# Patient Record
Sex: FEMALE | Race: WHITE | NOT HISPANIC OR LATINO | Employment: OTHER | URBAN - METROPOLITAN AREA
[De-identification: names, ages, dates, MRNs, and addresses within clinical notes are randomized per-mention and may not be internally consistent; named-entity substitution may affect disease eponyms.]

---

## 2017-01-19 ENCOUNTER — COMMUNICATION - HEALTHEAST (OUTPATIENT)
Dept: SCHEDULING | Facility: CLINIC | Age: 62
End: 2017-01-19

## 2017-01-19 DIAGNOSIS — I10 ESSENTIAL HYPERTENSION: ICD-10-CM

## 2017-01-28 ENCOUNTER — COMMUNICATION - HEALTHEAST (OUTPATIENT)
Dept: FAMILY MEDICINE | Facility: CLINIC | Age: 62
End: 2017-01-28

## 2017-01-28 DIAGNOSIS — E11.9 DIABETES (H): ICD-10-CM

## 2017-01-29 ENCOUNTER — COMMUNICATION - HEALTHEAST (OUTPATIENT)
Dept: SCHEDULING | Facility: CLINIC | Age: 62
End: 2017-01-29

## 2017-01-29 DIAGNOSIS — E03.9 HYPOTHYROIDISM: ICD-10-CM

## 2017-02-19 ENCOUNTER — COMMUNICATION - HEALTHEAST (OUTPATIENT)
Dept: FAMILY MEDICINE | Facility: CLINIC | Age: 62
End: 2017-02-19

## 2017-02-19 DIAGNOSIS — I10 ESSENTIAL HYPERTENSION: ICD-10-CM

## 2017-03-14 ENCOUNTER — OFFICE VISIT - HEALTHEAST (OUTPATIENT)
Dept: FAMILY MEDICINE | Facility: CLINIC | Age: 62
End: 2017-03-14

## 2017-03-14 ENCOUNTER — AMBULATORY - HEALTHEAST (OUTPATIENT)
Dept: FAMILY MEDICINE | Facility: CLINIC | Age: 62
End: 2017-03-14

## 2017-03-14 ENCOUNTER — COMMUNICATION - HEALTHEAST (OUTPATIENT)
Dept: TELEHEALTH | Facility: CLINIC | Age: 62
End: 2017-03-14

## 2017-03-14 DIAGNOSIS — Z13.6 SCREENING FOR CARDIOVASCULAR CONDITION: ICD-10-CM

## 2017-03-14 DIAGNOSIS — I10 ESSENTIAL HYPERTENSION WITH GOAL BLOOD PRESSURE LESS THAN 140/90: ICD-10-CM

## 2017-03-14 DIAGNOSIS — E03.9 HYPOTHYROIDISM, UNSPECIFIED TYPE: ICD-10-CM

## 2017-03-14 DIAGNOSIS — M25.519 CHRONIC SHOULDER PAIN: ICD-10-CM

## 2017-03-14 DIAGNOSIS — G89.29 CHRONIC SHOULDER PAIN: ICD-10-CM

## 2017-03-14 DIAGNOSIS — Z51.81 MEDICATION MONITORING ENCOUNTER: ICD-10-CM

## 2017-03-14 DIAGNOSIS — E11.9 DIABETES TYPE 2, CONTROLLED (H): ICD-10-CM

## 2017-03-14 DIAGNOSIS — G43.909 MIGRAINE: ICD-10-CM

## 2017-03-14 LAB
CHOLEST SERPL-MCNC: 160 MG/DL
FASTING STATUS PATIENT QL REPORTED: YES
HBA1C MFR BLD: 6.9 % (ref 3.5–6)
HDLC SERPL-MCNC: 51 MG/DL
LDLC SERPL CALC-MCNC: 87 MG/DL
TRIGL SERPL-MCNC: 112 MG/DL

## 2017-03-14 ASSESSMENT — MIFFLIN-ST. JEOR: SCORE: 1559.67

## 2017-03-15 ENCOUNTER — COMMUNICATION - HEALTHEAST (OUTPATIENT)
Dept: FAMILY MEDICINE | Facility: CLINIC | Age: 62
End: 2017-03-15

## 2017-04-03 ENCOUNTER — COMMUNICATION - HEALTHEAST (OUTPATIENT)
Dept: FAMILY MEDICINE | Facility: CLINIC | Age: 62
End: 2017-04-03

## 2017-04-03 DIAGNOSIS — I10 ESSENTIAL HYPERTENSION: ICD-10-CM

## 2017-04-15 ENCOUNTER — COMMUNICATION - HEALTHEAST (OUTPATIENT)
Dept: FAMILY MEDICINE | Facility: CLINIC | Age: 62
End: 2017-04-15

## 2017-04-17 ENCOUNTER — COMMUNICATION - HEALTHEAST (OUTPATIENT)
Dept: FAMILY MEDICINE | Facility: CLINIC | Age: 62
End: 2017-04-17

## 2017-04-25 ENCOUNTER — RECORDS - HEALTHEAST (OUTPATIENT)
Dept: ADMINISTRATIVE | Facility: OTHER | Age: 62
End: 2017-04-25

## 2017-04-29 ENCOUNTER — COMMUNICATION - HEALTHEAST (OUTPATIENT)
Dept: SCHEDULING | Facility: CLINIC | Age: 62
End: 2017-04-29

## 2017-04-29 DIAGNOSIS — E03.9 HYPOTHYROIDISM: ICD-10-CM

## 2017-05-21 ENCOUNTER — COMMUNICATION - HEALTHEAST (OUTPATIENT)
Dept: FAMILY MEDICINE | Facility: CLINIC | Age: 62
End: 2017-05-21

## 2017-05-21 DIAGNOSIS — I10 ESSENTIAL HYPERTENSION: ICD-10-CM

## 2017-07-20 ENCOUNTER — COMMUNICATION - HEALTHEAST (OUTPATIENT)
Dept: FAMILY MEDICINE | Facility: CLINIC | Age: 62
End: 2017-07-20

## 2017-08-04 ENCOUNTER — COMMUNICATION - HEALTHEAST (OUTPATIENT)
Dept: FAMILY MEDICINE | Facility: CLINIC | Age: 62
End: 2017-08-04

## 2017-08-04 DIAGNOSIS — E11.9 DIABETES (H): ICD-10-CM

## 2017-08-17 ENCOUNTER — COMMUNICATION - HEALTHEAST (OUTPATIENT)
Dept: TELEHEALTH | Facility: CLINIC | Age: 62
End: 2017-08-17

## 2017-08-17 ENCOUNTER — OFFICE VISIT - HEALTHEAST (OUTPATIENT)
Dept: FAMILY MEDICINE | Facility: CLINIC | Age: 62
End: 2017-08-17

## 2017-08-17 DIAGNOSIS — Z98.1 S/P CERVICAL SPINAL FUSION: ICD-10-CM

## 2017-08-17 DIAGNOSIS — E78.2 MIXED HYPERLIPIDEMIA: ICD-10-CM

## 2017-08-17 DIAGNOSIS — E03.9 HYPOTHYROIDISM, UNSPECIFIED TYPE: ICD-10-CM

## 2017-08-17 DIAGNOSIS — M54.12 CERVICAL RADICULAR PAIN: ICD-10-CM

## 2017-08-17 DIAGNOSIS — E11.9 DIABETES TYPE 2, CONTROLLED (H): ICD-10-CM

## 2017-08-17 DIAGNOSIS — M54.2 NECK PAIN: ICD-10-CM

## 2017-08-17 DIAGNOSIS — I10 ESSENTIAL HYPERTENSION WITH GOAL BLOOD PRESSURE LESS THAN 140/90: ICD-10-CM

## 2017-08-17 ASSESSMENT — MIFFLIN-ST. JEOR: SCORE: 1559.95

## 2017-08-24 ENCOUNTER — HOSPITAL ENCOUNTER (OUTPATIENT)
Dept: MRI IMAGING | Facility: CLINIC | Age: 62
Discharge: HOME | End: 2017-08-24
Attending: FAMILY MEDICINE

## 2017-08-24 DIAGNOSIS — M54.2 NECK PAIN: ICD-10-CM

## 2017-08-24 DIAGNOSIS — Z98.1 S/P CERVICAL SPINAL FUSION: ICD-10-CM

## 2017-08-24 DIAGNOSIS — M54.12 CERVICAL RADICULAR PAIN: ICD-10-CM

## 2017-09-02 ENCOUNTER — COMMUNICATION - HEALTHEAST (OUTPATIENT)
Dept: SCHEDULING | Facility: CLINIC | Age: 62
End: 2017-09-02

## 2017-09-05 ENCOUNTER — COMMUNICATION - HEALTHEAST (OUTPATIENT)
Dept: FAMILY MEDICINE | Facility: CLINIC | Age: 62
End: 2017-09-05

## 2017-09-06 ENCOUNTER — HOSPITAL ENCOUNTER (OUTPATIENT)
Dept: MAMMOGRAPHY | Facility: CLINIC | Age: 62
Discharge: HOME | End: 2017-09-06
Attending: FAMILY MEDICINE

## 2017-09-06 DIAGNOSIS — Z12.31 VISIT FOR SCREENING MAMMOGRAM: ICD-10-CM

## 2017-09-12 ENCOUNTER — OFFICE VISIT - HEALTHEAST (OUTPATIENT)
Dept: FAMILY MEDICINE | Facility: CLINIC | Age: 62
End: 2017-09-12

## 2017-09-12 DIAGNOSIS — M54.2 NECK PAIN: ICD-10-CM

## 2017-09-12 DIAGNOSIS — M48.02 SPINAL STENOSIS OF CERVICAL REGION: ICD-10-CM

## 2017-09-12 DIAGNOSIS — H65.90 SEROUS OTITIS MEDIA: ICD-10-CM

## 2017-09-12 ASSESSMENT — MIFFLIN-ST. JEOR: SCORE: 1551.95

## 2017-09-25 ENCOUNTER — COMMUNICATION - HEALTHEAST (OUTPATIENT)
Dept: ADMINISTRATIVE | Facility: CLINIC | Age: 62
End: 2017-09-25

## 2017-09-25 ENCOUNTER — COMMUNICATION - HEALTHEAST (OUTPATIENT)
Dept: FAMILY MEDICINE | Facility: CLINIC | Age: 62
End: 2017-09-25

## 2017-09-25 DIAGNOSIS — H92.01 RIGHT EAR PAIN: ICD-10-CM

## 2017-09-25 DIAGNOSIS — R42 DIZZINESS: ICD-10-CM

## 2017-09-26 ENCOUNTER — COMMUNICATION - HEALTHEAST (OUTPATIENT)
Dept: FAMILY MEDICINE | Facility: CLINIC | Age: 62
End: 2017-09-26

## 2017-10-02 ENCOUNTER — RECORDS - HEALTHEAST (OUTPATIENT)
Dept: ADMINISTRATIVE | Facility: OTHER | Age: 62
End: 2017-10-02

## 2017-10-14 ENCOUNTER — COMMUNICATION - HEALTHEAST (OUTPATIENT)
Dept: FAMILY MEDICINE | Facility: CLINIC | Age: 62
End: 2017-10-14

## 2017-10-14 DIAGNOSIS — I10 HYPERTENSION: ICD-10-CM

## 2017-10-31 ENCOUNTER — OFFICE VISIT - HEALTHEAST (OUTPATIENT)
Dept: FAMILY MEDICINE | Facility: CLINIC | Age: 62
End: 2017-10-31

## 2017-10-31 DIAGNOSIS — E78.2 MIXED HYPERLIPIDEMIA: ICD-10-CM

## 2017-10-31 DIAGNOSIS — I10 ESSENTIAL HYPERTENSION WITH GOAL BLOOD PRESSURE LESS THAN 140/90: ICD-10-CM

## 2017-10-31 DIAGNOSIS — E03.9 HYPOTHYROIDISM, UNSPECIFIED TYPE: ICD-10-CM

## 2017-10-31 DIAGNOSIS — R42 VERTIGO: ICD-10-CM

## 2017-10-31 DIAGNOSIS — E11.9 DIABETES TYPE 2, CONTROLLED (H): ICD-10-CM

## 2017-10-31 DIAGNOSIS — M50.30 DEGENERATIVE DISC DISEASE, CERVICAL: ICD-10-CM

## 2017-10-31 LAB
CHOLEST SERPL-MCNC: 168 MG/DL
FASTING STATUS PATIENT QL REPORTED: YES
HBA1C MFR BLD: 6.6 % (ref 3.5–6)
HDLC SERPL-MCNC: 53 MG/DL
LDLC SERPL CALC-MCNC: 94 MG/DL
TRIGL SERPL-MCNC: 104 MG/DL

## 2017-10-31 ASSESSMENT — MIFFLIN-ST. JEOR: SCORE: 1547.76

## 2017-11-02 ENCOUNTER — COMMUNICATION - HEALTHEAST (OUTPATIENT)
Dept: FAMILY MEDICINE | Facility: CLINIC | Age: 62
End: 2017-11-02

## 2017-11-02 DIAGNOSIS — E11.9 DIABETES (H): ICD-10-CM

## 2017-11-09 ENCOUNTER — COMMUNICATION - HEALTHEAST (OUTPATIENT)
Dept: FAMILY MEDICINE | Facility: CLINIC | Age: 62
End: 2017-11-09

## 2017-12-17 ENCOUNTER — COMMUNICATION - HEALTHEAST (OUTPATIENT)
Dept: FAMILY MEDICINE | Facility: CLINIC | Age: 62
End: 2017-12-17

## 2018-04-20 ENCOUNTER — COMMUNICATION - HEALTHEAST (OUTPATIENT)
Dept: SCHEDULING | Facility: CLINIC | Age: 63
End: 2018-04-20

## 2018-04-20 DIAGNOSIS — I10 ESSENTIAL HYPERTENSION: ICD-10-CM

## 2018-05-25 ENCOUNTER — COMMUNICATION - HEALTHEAST (OUTPATIENT)
Dept: SCHEDULING | Facility: CLINIC | Age: 63
End: 2018-05-25

## 2018-09-06 ENCOUNTER — RECORDS - HEALTHEAST (OUTPATIENT)
Dept: ADMINISTRATIVE | Facility: OTHER | Age: 63
End: 2018-09-06

## 2018-11-20 ENCOUNTER — RECORDS - HEALTHEAST (OUTPATIENT)
Dept: ADMINISTRATIVE | Facility: OTHER | Age: 63
End: 2018-11-20

## 2020-08-13 ENCOUNTER — OFFICE VISIT (OUTPATIENT)
Dept: NEUROLOGY | Facility: CLINIC | Age: 65
End: 2020-08-13
Payer: COMMERCIAL

## 2020-08-13 VITALS — WEIGHT: 211 LBS | HEIGHT: 64 IN | BODY MASS INDEX: 36.02 KG/M2

## 2020-08-13 DIAGNOSIS — G43.709 CHRONIC MIGRAINE WITHOUT AURA WITHOUT STATUS MIGRAINOSUS, NOT INTRACTABLE: Primary | ICD-10-CM

## 2020-08-13 PROBLEM — M54.2 NECK PAIN: Status: ACTIVE | Noted: 2020-08-13

## 2020-08-13 PROBLEM — G43.909 MIGRAINE HEADACHE: Status: ACTIVE | Noted: 2020-08-13

## 2020-08-13 PROCEDURE — 99213 OFFICE O/P EST LOW 20 MIN: CPT | Mod: 95 | Performed by: PSYCHIATRY & NEUROLOGY

## 2020-08-13 RX ORDER — HYDROCODONE BITARTRATE AND ACETAMINOPHEN 5; 325 MG/1; MG/1
1 TABLET ORAL PRN
COMMUNITY
Start: 2020-07-16

## 2020-08-13 RX ORDER — LOSARTAN POTASSIUM 50 MG/1
50 TABLET ORAL DAILY
COMMUNITY
Start: 2019-10-31

## 2020-08-13 RX ORDER — LEVOTHYROXINE SODIUM 75 UG/1
75 TABLET ORAL DAILY
COMMUNITY
Start: 2019-10-31

## 2020-08-13 RX ORDER — DIVALPROEX SODIUM 500 MG/1
1000 TABLET, EXTENDED RELEASE ORAL DAILY
Qty: 180 TABLET | Refills: 3 | Status: SHIPPED | OUTPATIENT
Start: 2020-08-13 | End: 2021-06-01

## 2020-08-13 RX ORDER — SEMAGLUTIDE 1.34 MG/ML
0.5 INJECTION, SOLUTION SUBCUTANEOUS WEEKLY
COMMUNITY
Start: 2020-07-31

## 2020-08-13 RX ORDER — DIVALPROEX SODIUM 250 MG/1
500 TABLET, EXTENDED RELEASE ORAL DAILY
COMMUNITY
Start: 2019-09-16

## 2020-08-13 RX ORDER — BACLOFEN 10 MG/1
10 TABLET ORAL DAILY
COMMUNITY
Start: 2020-06-04

## 2020-08-13 RX ORDER — DIVALPROEX SODIUM 250 MG/1
250 TABLET, DELAYED RELEASE ORAL DAILY
COMMUNITY

## 2020-08-13 RX ORDER — SIMVASTATIN 20 MG
20 TABLET ORAL DAILY
COMMUNITY
Start: 2020-05-08

## 2020-08-13 RX ORDER — RIZATRIPTAN BENZOATE 10 MG/1
10 TABLET ORAL DAILY
COMMUNITY
Start: 2019-10-22

## 2020-08-13 RX ORDER — POTASSIUM CHLORIDE 1500 MG/1
20 TABLET, EXTENDED RELEASE ORAL DAILY
COMMUNITY
Start: 2019-10-31

## 2020-08-13 RX ORDER — ESTRADIOL 0.1 MG/G
1 CREAM VAGINAL DAILY
COMMUNITY
Start: 2019-10-31

## 2020-08-13 RX ORDER — METOPROLOL TARTRATE 50 MG
50 TABLET ORAL DAILY
COMMUNITY
Start: 2020-01-21

## 2020-08-13 RX ORDER — HYDROCHLOROTHIAZIDE 50 MG/1
50 TABLET ORAL DAILY
COMMUNITY
Start: 2019-10-31

## 2020-08-13 RX ORDER — GABAPENTIN 100 MG/1
600 CAPSULE ORAL DAILY
COMMUNITY
Start: 2019-10-22

## 2020-08-13 ASSESSMENT — MIFFLIN-ST. JEOR: SCORE: 1487.09

## 2020-08-13 NOTE — NURSING NOTE
Chief Complaint   Patient presents with     Follow Up     yealy check in for meication- stopped taken afternoon dose 12/17/19- RX needs to be changed.      Only 4 migraines since stopping the afternoon use of Divalproex.  Doing well at this time.  Patient also notes her Gabapentin medication has recently been increased.    Phone visit-please call [phone]  Vicki Guillen ATC

## 2020-08-13 NOTE — PROGRESS NOTES
Isabelle Caraballo  65 year old  MRN:[id number]  PCP: Vonnie James  No ref. provider found    Allergies   Allergen Reactions     Ibuprofen Shortness Of Breath     Lisinopril Cough     Meperidine Nausea and Vomiting     Metformin Headache     Other reaction(s): headaches  Gastrointestinal distress, migraines       Methylprednisolone      Other reaction(s): shortness of breath     Metronidazole        Current Outpatient Medications   Medication Sig Dispense Refill     baclofen (LIORESAL) 10 MG tablet Take 10 mg by mouth daily       divalproex sodium extended-release (DEPAKOTE ER) 250 MG 24 hr tablet Take 500 mg by mouth daily       estradiol (ESTRACE) 0.1 MG/GM vaginal cream Place 1 g vaginally daily       gabapentin (NEURONTIN) 100 MG capsule Take 600 mg by mouth daily At bed time       hydrochlorothiazide (HYDRODIURIL) 50 MG tablet Take 50 mg by mouth daily       HYDROcodone-acetaminophen (NORCO) 5-325 MG tablet Take 1 tablet by mouth as needed       levothyroxine (SYNTHROID/LEVOTHROID) 75 MCG tablet Take 75 mcg by mouth daily       losartan (COZAAR) 50 MG tablet Take 50 mg by mouth daily       metoprolol tartrate (LOPRESSOR) 50 MG tablet Take 50 mg by mouth daily       potassium chloride ER (KLOR-CON M) 20 MEQ CR tablet Take 20 mEq by mouth daily       rizatriptan (MAXALT) 10 MG tablet Take 10 mg by mouth daily       Semaglutide,0.25 or 0.5MG/DOS, (OZEMPIC, 0.25 OR 0.5 MG/DOSE,) 2 MG/1.5ML SOPN Inject 0.5 mg Subcutaneous once a week       simvastatin (ZOCOR) 20 MG tablet Take 20 mg by mouth daily       divalproex sodium delayed-release (DEPAKOTE) 250 MG DR tablet Take 250 mg by mouth daily 2 am- 2 pm         CHIEF COMPLAINT: Follow-up migraine headaches.  Left arm pain.    HISTORY SINCE LAST VISIT: A telephone encounter was done today in lieu of office visit.  Patient was agreeable to this type of encounter.  I spoke with Ms. Caraballo today in neurologic follow-up of her migraine headaches.  I had last seen her on  October 22, 2019.  Her migraine headaches had been doing better on a higher dose of Depakote, taking 500 mg 3 times a day.  We have discussed decreasing her Depakote dosage if her liver function tests were still elevated.  Her ALT had been 61 and AST had been 74 on 10/31/2019.  She had decreased her dosage to 500 mg twice daily in December 2019.  She has only had 4 migrainous headaches in the intervening time.  There is been no loss of vision or double vision.  No speech or swallowing problems.  She feels her strength is been okay.  She has had problems with left arm pain, starting in May [place] and half of July.  The pain does go down her left arm.  There has been an increase of her gabapentin to 600 mg at bedtime, with the possibility of increasing to 600 mg twice daily.  ANGE Thornton mentioned altered feeling in a left C7 distribution.  The patient reports numbness on the top of her hand.  Patient reports pain in the hands been controlled with the addition of Vicodin.  She has an upcoming appointment with ANGE Prince  PAST MEDICAL HISTORY:   Past Medical History:   Diagnosis Date     Diabetes (H)      Migraines      Spinal stenosis      Patient Active Problem List   Diagnosis     Migraine headache     Neck pain     History reviewed. No pertinent surgical history.    FAMILY HISTORY:  Family History   Problem Relation Age of Onset     No Known Problems Mother      No Known Problems Father        SOCIAL HISTORY:  Social History     Socioeconomic History     Marital status:      Spouse name: Not on file     Number of children: Not on file     Years of education: Not on file     Highest education level: Not on file   Occupational History     Not on file   Social Needs     Financial resource strain: Not on file     Food insecurity     Worry: Not on file     Inability: Not on file     Transportation needs     Medical: Not on file     Non-medical: Not on file   Tobacco Use     Smoking status: Former  Smoker     Types: Cigarettes     Smokeless tobacco: Never Used     Tobacco comment: quite more than 30 years ago   Substance and Sexual Activity     Alcohol use: Not Currently     Drug use: Never     Sexual activity: Yes   Lifestyle     Physical activity     Days per week: Not on file     Minutes per session: Not on file     Stress: Not on file   Relationships     Social connections     Talks on phone: Not on file     Gets together: Not on file     Attends Church service: Not on file     Active member of club or organization: Not on file     Attends meetings of clubs or organizations: Not on file     Relationship status: Not on file     Intimate partner violence     Fear of current or ex partner: Not on file     Emotionally abused: Not on file     Physically abused: Not on file     Forced sexual activity: Not on file   Other Topics Concern     Parent/sibling w/ CABG, MI or angioplasty before 65F 55M? No   Social History Narrative     Not on file       REVIEW OF SYSTEMS:    Constitutional: No fever or chills.  Eyes: No loss of vision or double vision.  Ears/Nose/Throat: Positive for hearing loss.  Positive for vertigo.  Respiratory: No shortness of breath or cough.  Genitourinary: Bladder control has been better.  Positive nocturia  Musculoskeletal: Positive neck and left arm pain.  Neurologic: See above.  Psychiatric: Positive for depression.  Hematologic/Lymphatic/Immunologic:    Endocrine: Feels her diabetes has been stable.    Pain: On a scale of 1 being no pain to 10 being extremely painful. Patient States: 4 based upon left arm pain    PHYSICAL EXAMINATION:    General appearance: Unable-telephone encounter      NEUROLOGIC EXAMINATION:  Alert oriented x3.  Speech is fluent.  Normal repetition and comprehension.  Can tell me the name of the president and the governor.              IMPRESSION: 1.  Chronic migraine.  She reports her headaches have been under better control with her taking 500 mg twice daily of  Depakote ER, with not taking the midday dosage.  She has had 4 migraines since December.  We discussed with the extended release preparation, she can just take this 1 time a day and I have sent in a prescription for 500 mg tablets of Depakote extended release.  She can take 2 pills at bedtime and see how her headaches do.  2.  Neck pain/pain down the left arm.  This does sound to fit with a cervical radiculopathy.  There is been improvement with the increase of gabapentin with the use of Vicodin.  We discussed that individuals who have fusions can have problems at the level above or below the area of the fusion.  Last MRI had been 3 years ago which showed mild narrowing of the left neuroforamina at C4-5 with moderate left foraminal narrowing at C5-6.  I told her I would agree that going ahead with an MRI scan would be good to do to see if there has been any interval change since 2017, especially with the persistent pain.  Start of telephone encounter: 1:05 PM  End of telephone encounter: 1:28 PM            Anil Bowles MD, MD

## 2020-08-13 NOTE — LETTER
8/13/2020         RE: Isabelle Caraballo  [address]  [place] [address]        Dear Colleague,    Thank you for referring your patient, Isabelle Caraballo, to the [facility]. Please see a copy of my visit note below.        Isabelle Caraballo  65 year old  MRN:[id number]  PCP: Vonnie James  No ref. provider found    Allergies   Allergen Reactions     Ibuprofen Shortness Of Breath     Lisinopril Cough     Meperidine Nausea and Vomiting     Metformin Headache     Other reaction(s): headaches  Gastrointestinal distress, migraines       Methylprednisolone      Other reaction(s): shortness of breath     Metronidazole        Current Outpatient Medications   Medication Sig Dispense Refill     baclofen (LIORESAL) 10 MG tablet Take 10 mg by mouth daily       divalproex sodium extended-release (DEPAKOTE ER) 250 MG 24 hr tablet Take 500 mg by mouth daily       estradiol (ESTRACE) 0.1 MG/GM vaginal cream Place 1 g vaginally daily       gabapentin (NEURONTIN) 100 MG capsule Take 600 mg by mouth daily At bed time       hydrochlorothiazide (HYDRODIURIL) 50 MG tablet Take 50 mg by mouth daily       HYDROcodone-acetaminophen (NORCO) 5-325 MG tablet Take 1 tablet by mouth as needed       levothyroxine (SYNTHROID/LEVOTHROID) 75 MCG tablet Take 75 mcg by mouth daily       losartan (COZAAR) 50 MG tablet Take 50 mg by mouth daily       metoprolol tartrate (LOPRESSOR) 50 MG tablet Take 50 mg by mouth daily       potassium chloride ER (KLOR-CON M) 20 MEQ CR tablet Take 20 mEq by mouth daily       rizatriptan (MAXALT) 10 MG tablet Take 10 mg by mouth daily       Semaglutide,0.25 or 0.5MG/DOS, (OZEMPIC, 0.25 OR 0.5 MG/DOSE,) 2 MG/1.5ML SOPN Inject 0.5 mg Subcutaneous once a week       simvastatin (ZOCOR) 20 MG tablet Take 20 mg by mouth daily       divalproex sodium delayed-release (DEPAKOTE) 250 MG DR tablet Take 250 mg by mouth daily 2 am- 2 pm         CHIEF COMPLAINT: Follow-up migraine headaches.  Left arm  pain.    HISTORY SINCE LAST VISIT: A telephone encounter was done today in lieu of office visit.  Patient was agreeable to this type of encounter.  I spoke with Ms. Caraballo today in neurologic follow-up of her migraine headaches.  I had last seen her on October 22, 2019.  Her migraine headaches had been doing better on a higher dose of Depakote, taking 500 mg 3 times a day.  We have discussed decreasing her Depakote dosage if her liver function tests were still elevated.  Her ALT had been 61 and AST had been 74 on 10/31/2019.  She had decreased her dosage to 500 mg twice daily in December 2019.  She has only had 4 migrainous headaches in the intervening time.  There is been no loss of vision or double vision.  No speech or swallowing problems.  She feels her strength is been okay.  She has had problems with left arm pain, starting in May [place] and half of July.  The pain does go down her left arm.  There has been an increase of her gabapentin to 600 mg at bedtime, with the possibility of increasing to 600 mg twice daily.  ANGE Thornton mentioned altered feeling in a left C7 distribution.  The patient reports numbness on the top of her hand.  Patient reports pain in the hands been controlled with the addition of Vicodin.  She has an upcoming appointment with ANGE Prince  PAST MEDICAL HISTORY:   Past Medical History:   Diagnosis Date     Diabetes (H)      Migraines      Spinal stenosis      Patient Active Problem List   Diagnosis     Migraine headache     Neck pain     History reviewed. No pertinent surgical history.    FAMILY HISTORY:  Family History   Problem Relation Age of Onset     No Known Problems Mother      No Known Problems Father        SOCIAL HISTORY:  Social History     Socioeconomic History     Marital status:      Spouse name: Not on file     Number of children: Not on file     Years of education: Not on file     Highest education level: Not on file   Occupational History     Not on  file   Social Needs     Financial resource strain: Not on file     Food insecurity     Worry: Not on file     Inability: Not on file     Transportation needs     Medical: Not on file     Non-medical: Not on file   Tobacco Use     Smoking status: Former Smoker     Types: Cigarettes     Smokeless tobacco: Never Used     Tobacco comment: quite more than 30 years ago   Substance and Sexual Activity     Alcohol use: Not Currently     Drug use: Never     Sexual activity: Yes   Lifestyle     Physical activity     Days per week: Not on file     Minutes per session: Not on file     Stress: Not on file   Relationships     Social connections     Talks on phone: Not on file     Gets together: Not on file     Attends Holiness service: Not on file     Active member of club or organization: Not on file     Attends meetings of clubs or organizations: Not on file     Relationship status: Not on file     Intimate partner violence     Fear of current or ex partner: Not on file     Emotionally abused: Not on file     Physically abused: Not on file     Forced sexual activity: Not on file   Other Topics Concern     Parent/sibling w/ CABG, MI or angioplasty before 65F 55M? No   Social History Narrative     Not on file       REVIEW OF SYSTEMS:    Constitutional: No fever or chills.  Eyes: No loss of vision or double vision.  Ears/Nose/Throat: Positive for hearing loss.  Positive for vertigo.  Respiratory: No shortness of breath or cough.  Genitourinary: Bladder control has been better.  Positive nocturia  Musculoskeletal: Positive neck and left arm pain.  Neurologic: See above.  Psychiatric: Positive for depression.  Hematologic/Lymphatic/Immunologic:    Endocrine: Feels her diabetes has been stable.    Pain: On a scale of 1 being no pain to 10 being extremely painful. Patient States: 4 based upon left arm pain    PHYSICAL EXAMINATION:    General appearance: Unable-telephone encounter      NEUROLOGIC EXAMINATION:  Alert oriented x3.   Speech is fluent.  Normal repetition and comprehension.  Can tell me the name of the president and the governor.              IMPRESSION: 1.  Chronic migraine.  She reports her headaches have been under better control with her taking 500 mg twice daily of Depakote ER, with not taking the midday dosage.  She has had 4 migraines since December.  We discussed with the extended release preparation, she can just take this 1 time a day and I have sent in a prescription for 500 mg tablets of Depakote extended release.  She can take 2 pills at bedtime and see how her headaches do.  2.  Neck pain/pain down the left arm.  This does sound to fit with a cervical radiculopathy.  There is been improvement with the increase of gabapentin with the use of Vicodin.  We discussed that individuals who have fusions can have problems at the level above or below the area of the fusion.  Last MRI had been 3 years ago which showed mild narrowing of the left neuroforamina at C4-5 with moderate left foraminal narrowing at C5-6.  I told her I would agree that going ahead with an MRI scan would be good to do to see if there has been any interval change since 2017, especially with the persistent pain.  Start of telephone encounter: 1:05 PM  End of telephone encounter: 1:28 PM            Anil Bowlse MD, MD    Again, thank you for allowing me to participate in the care of your patient.        Sincerely,        Anil Bowles MD, MD

## 2020-08-13 NOTE — PATIENT INSTRUCTIONS
I have switched your Depakote extended release tablets to 500 mg strength tablets.  With this being an extended release formulation, you can take it just 1 time a day.  I recommend taking 2 of the tablets at bedtime.  We can see how this will do for your headaches.  We can plan follow-up in 1 years time.

## 2020-08-17 ENCOUNTER — TELEPHONE (OUTPATIENT)
Dept: NEUROLOGY | Facility: CLINIC | Age: 65
End: 2020-08-17

## 2020-08-17 NOTE — TELEPHONE ENCOUNTER
Pt lmasking to clarify directions on the Depakote. Dr Bowles changed it to 500 mg 2 at bedtime. Pharm has it as taking in the am. [phone]

## 2020-08-17 NOTE — TELEPHONE ENCOUNTER
I left message for patient to call.  Instructions on prescription sent to pharmacy by Dr. Bowles  indicate patient is to take two tabs daily.  Dr. Bowles's after visit summary which I am sending in mail to patient recommends taking the 2 extended release tablets at bedtime.  Perla Ortiz on 8/17/2020 at 1:57 PM

## 2020-08-21 NOTE — TELEPHONE ENCOUNTER
I spoke with patient who says she received visit summary in the mail that states she is to take the Depakote ER at bedtime.  Patient says she is taking Depakote ER at bedtime as instructed.  Perla Ortiz on 8/21/2020 at 11:35 AM

## 2021-02-02 ENCOUNTER — TELEPHONE (OUTPATIENT)
Dept: NEUROLOGY | Facility: CLINIC | Age: 66
End: 2021-02-02

## 2021-02-02 NOTE — TELEPHONE ENCOUNTER
Pt called to ask if she could take her Divelproex 500 MG 1 bid, instead of 2 at bedtime. She feels this might better help with the head and back pain. [phone] ok to lm

## 2021-02-04 NOTE — TELEPHONE ENCOUNTER
Patient informed of Dr. Bowles's message.  Patient says she took two Depakote last night and asks how to proceed with the change to one twice a day.  Patient informed per Dr. Bowles to take one now, one tonight and then start one twice a day ( morning and night ) tomorrow.  Patient commented that she was in [place] this past November with Covid 19 that made her very ill.  Patient says she is planning to get the vaccine.  Perla Ortiz RN on 2/4/2021 at 2:43 PM

## 2021-05-26 ENCOUNTER — RECORDS - HEALTHEAST (OUTPATIENT)
Dept: ADMINISTRATIVE | Facility: CLINIC | Age: 66
End: 2021-05-26

## 2021-05-27 ENCOUNTER — RECORDS - HEALTHEAST (OUTPATIENT)
Dept: ADMINISTRATIVE | Facility: CLINIC | Age: 66
End: 2021-05-27

## 2021-05-28 ENCOUNTER — RECORDS - HEALTHEAST (OUTPATIENT)
Dept: ADMINISTRATIVE | Facility: CLINIC | Age: 66
End: 2021-05-28

## 2021-05-29 ENCOUNTER — RECORDS - HEALTHEAST (OUTPATIENT)
Dept: ADMINISTRATIVE | Facility: CLINIC | Age: 66
End: 2021-05-29

## 2021-05-30 ENCOUNTER — RECORDS - HEALTHEAST (OUTPATIENT)
Dept: ADMINISTRATIVE | Facility: CLINIC | Age: 66
End: 2021-05-30

## 2021-05-30 VITALS — WEIGHT: 227 LBS | BODY MASS INDEX: 38.76 KG/M2 | HEIGHT: 64 IN

## 2021-05-31 VITALS — BODY MASS INDEX: 38.46 KG/M2 | HEIGHT: 64 IN | WEIGHT: 225.3 LBS

## 2021-05-31 VITALS — HEIGHT: 64 IN | WEIGHT: 227.06 LBS | BODY MASS INDEX: 38.76 KG/M2

## 2021-05-31 VITALS — WEIGHT: 224.38 LBS | BODY MASS INDEX: 38.31 KG/M2 | HEIGHT: 64 IN

## 2021-06-02 ENCOUNTER — RECORDS - HEALTHEAST (OUTPATIENT)
Dept: ADMINISTRATIVE | Facility: CLINIC | Age: 66
End: 2021-06-02

## 2021-06-09 NOTE — PROGRESS NOTES
OV     3/14/2017  Assessment:     1. Diabetes type 2, controlled  Glycosylated Hemoglobin A1c    Comprehensive Metabolic Panel   2. Essential hypertension with goal blood pressure less than 140/90  Comprehensive Metabolic Panel   3. Hypothyroidism, unspecified type  Thyroid Stimulating Hormone (TSH)    T4, Free   4. Migraine     5. Chronic shoulder pain     6. Medication monitoring encounter  HM2(CBC w/o Differential)    Valproic Acid (Depakene )   7. Screening for cardiovascular condition  Lipid Profile         Plan:      Fasting labs were drawn.  Blood sugars are under worsening, but adequate control. We reviewed her current medications and she will continue the same pending additional lab results. We discussed the idea that if her A1c increases further, we will want to adjust her treatment. We reviewed dietary recommendations, including low salt and high fiber diet, and recommendations for regular exercise/activity. She will continue the baclofen for her shoulder symptoms and depakote for her migraine headaches. She will plan to follow up in 3-4 mos for repeat fasting labs and med check, sooner if any difficulties.      Subjective:   Fasting today? Yes    Diabetes      Isabelle Caraballo is a 61 y.o. female who presents for follow-up of Type 2 diabetes mellitus. Compliance with treatment has been fair. Current symptoms/problems include hypoglycemia  and increase appetite. Patient denies foot ulcerations, nausea, paresthesia of the feet, polyuria, visual disturbances and vomiting. Home sugars: BGs consistently in an acceptable range. Current monitoring regimen: home blood tests - 1-2 times daily. Any episodes of hypoglycemia? yes - occasional mild symptoms. Weight trend: fluctuating a bit. Last dilated eye exam: unsure.      Known diabetic complications: none. Current diabetic medications include oral agent (monotherapy): glipizide (generic). Current side effects: none. Prior visit with dietician: yes. Current diet: in  "general, an \"unhealthy\" diet. She's been careless with sugar in diet recently. Current exercise: no regular exercise.      Is she on ACE inhibitor or angiotensin II receptor blocker? Yes.  lisinopril (generic)     Hyperlipidemia & Hypertension    Patient is also here for follow-up of hypertension and dyslipidemia. A repeat fasting lipid profile was done. Compliance with treatment has been good. Patient denies muscle pain associated with her medications. Cardiac symptoms: none.  Patient denies chest pain, dyspnea, exertional chest pressure/discomfort, fatigue, lower extremity edema and near-syncope. The patient exercises rarely. Previous history of cardiac disease includes: none.    Review of Systems  Pertinent items are noted in HPI.      Objective:        Visit Vitals     /86     Pulse 64     Ht 5' 4\" (1.626 m)     Wt (!) 227 lb (103 kg)     BMI 38.96 kg/m2     General:    Alert, cooperative, no distress   Head:    Normocephalic, without obvious abnormality, atraumatic   Eyes:    PERRL, conjunctiva/corneas clear, EOM's intact    Ears:    Normal TM's and external ear canals, both ears   Nose:   Nares normal, mucosa normal, no drainage or sinus tenderness   Throat:   Lips, mucosa, and tongue normal; teeth and gums normal   Neck:   Supple, symmetrical,  no adenopathy;  thyroid:  normal   Back:     Symmetric, ROM normal, no CVA tenderness   Lungs:     Clear to auscultation bilaterally, respirations unlabored   CV:    Regular rate and rhythm   Abdomen:     Soft, non-tender, bowel sounds active all four quadrants,     no masses, no organomegaly   Extremities:   Extremities normal, atraumatic, no cyanosis or edema   Pulses:   2+ and symmetric all extremities   Skin:   Skin color, texture, turgor normal, no rashes or lesions   Neurologic:   Normal strength, sensation and reflexes throughout       Laboratory:     Results for orders placed or performed in visit on 03/14/17   Glycosylated Hemoglobin A1c   Result Value " Ref Range    Hemoglobin A1c 6.9 (H) 3.5 - 6.0 %   Comprehensive Metabolic Panel   Result Value Ref Range    Sodium 141 136 - 145 mmol/L    Potassium 4.2 3.5 - 5.0 mmol/L    Chloride 104 98 - 107 mmol/L    CO2 27 22 - 31 mmol/L    Anion Gap, Calculation 10 5 - 18 mmol/L    Glucose 125 70 - 125 mg/dL    BUN 20 8 - 22 mg/dL    Creatinine 0.81 0.60 - 1.10 mg/dL    GFR MDRD Af Amer >60 >60 mL/min/1.73m2    GFR MDRD Non Af Amer >60 >60 mL/min/1.73m2    Bilirubin, Total 0.4 0.0 - 1.0 mg/dL    Calcium 10.2 8.5 - 10.5 mg/dL    Protein, Total 7.2 6.0 - 8.0 g/dL    Albumin 3.9 3.5 - 5.0 g/dL    Alkaline Phosphatase 70 45 - 120 U/L    AST 39 0 - 40 U/L    ALT 41 0 - 45 U/L   Lipid Profile   Result Value Ref Range    Triglycerides 112 <=149 mg/dL    Cholesterol 160 <=199 mg/dL    LDL Calculated 87 <=129 mg/dL    HDL Cholesterol 51 >=50 mg/dL    Patient Fasting > 8hrs? Yes    HM2(CBC w/o Differential)   Result Value Ref Range    WBC 3.9 (L) 4.0 - 11.0 thou/uL    RBC 4.99 3.80 - 5.40 mill/uL    Hemoglobin 14.6 12.0 - 16.0 g/dL    Hematocrit 44.0 35.0 - 47.0 %    MCV 88 80 - 100 fL    MCH 29.3 27.0 - 34.0 pg    MCHC 33.2 32.0 - 36.0 g/dL    RDW 13.4 11.0 - 14.5 %    Platelets 221 140 - 440 thou/uL    MPV 8.3 7.0 - 10.0 fL   Valproic Acid (Depakene )   Result Value Ref Range    Valproic Acid 54.8 50.0 - 150.0 ug/mL   Thyroid Stimulating Hormone (TSH)   Result Value Ref Range    TSH 2.13 0.30 - 5.00 uIU/mL   T4, Free   Result Value Ref Range    Free T4 1.2 0.7 - 1.8 ng/dL

## 2021-06-12 NOTE — PROGRESS NOTES
Assessment:     1. Neck pain  MR Cervical Spine Without Contrast   2. Cervical radicular pain  MR Cervical Spine Without Contrast   3. S/P cervical spinal fusion  MR Cervical Spine Without Contrast   4. Diabetes type 2, controlled     5. Essential hypertension with goal blood pressure less than 140/90     6. Hypothyroidism, unspecified type           Plan:       We reviewed the likely/potential etiology(ies) for her neck symptoms and we will proceed with  an MRI of the cervical spine to determine the best treatment options. I have sent a new Rx for diazepam prior to the test. We discussed the potential next steps being follow up with [facility] or her neurosurgeon. We reviewed activity as tolerated and use of heat and/or ice [id number] for comfort, and I will send a new Rx for hydrocodone prn more severe pain. The baclofen was renewed at QID dosing prn. She will call or return to clinic with any ongoing or worsening symptoms. As far as her diabetes, hypothyroidism and hyperlipidemia, she will return to clinic for fasting labs in the next several weeks. She will continue her same medications at this time.     Subjective:             Isabelle Caraballo is a 62 y.o. female who presents for evaluation of worsening neck and arm pain. Event that precipitated these symptoms: none known. Onset of symptoms was 3 months ago, and have been stable since that time. Current symptoms are pain in neck and left arm (aching and shooting in character; mod-severe in severity), and is worse with any movement of the arms. Patient denies numbness, paresthesias and weakness in the arm/hand. Patient has had previous herniated cervical disc and previous spinal surgery - cervical fusion in 2011. Previous treatments: neck surgery, result: improved sx until recently. Using salonpas on neck. Baclofen wears off after about 6 hrs. Called neurosurgery and told to start here.    Review of Systems  Pertinent items are noted in HPI.    A recent MRI of the  "shoulder showed tendonitis.       Objective:        /78  Pulse 80  Ht 5' 4\" (1.626 m)  Wt (!) 227 lb 1 oz (103 kg)  Breastfeeding? No  BMI 38.98 kg/m2  GEN: Alert and oriented, NAD, well nourished  SKIN:  Normal skin turgor, no lesions/rashes   HEENT: NC/AT, moist mucous membranes, no rhinorrhea.    NECK: Normal.  No adenopathy or thyromegaly.  CV: Regular rate and rhythm, no murmurs.   LUNGS: Clear to auscultation bilaterally.    ABDOMEN: Soft, non-tender, non-distended, no masses   BACK: Normal  EXTREMITY: No edema, cyanosis  NEURO: Grossly normal.    "

## 2021-06-13 NOTE — PROGRESS NOTES
PROGRESS NOTE   9/12/2017    SUBJECTIVE:  Isabelle Caraballo is a 62 y.o. female  who presents for   Chief Complaint   Patient presents with     Follow-up     Talk about MRI     Follow-up     Recheck ER visit, inner ear problem     Patient comes in today for a couple of different concerns and follow-up on a couple of different issues.  She notes that she would like to discuss the results of her MRI that was done at the end of August as a follow-up of her neck pain and arm pain.  She also was recently in the emergency room for an ear problem and needs follow-up on that as well.  In terms of her ears she has had ear problems all of her life and a chronic ear infections as a child.  She has hearing loss as results of that.  She has been living with that all of her life but all of a sudden on 9/2/17 she woke up and she could not walk and she felt really offkilter.  She ended up going to the emergency room and she was diagnosed with an inner ear infection.  They did a CT at that time and everything seemed to be fine.  She was given amoxicillin which she is finishing today.  When she was seen in the emergency room she did not have any ear pain but when she started the amoxicillin then she began having ear pain but the pain now seems to have gotten better.  The dizziness and the feeling offkilter seems to be much better as well however she still gets the sensation where she is offkilter occasionally.  She notes that she will lay down in bed and shut her eyes because she does not want to feel that room spinning type sensation but it definitely is still there.  She notes that it comes if she lays down or really if she changes position.  Otherwise it does not seem to bother her.  She was having to use meclizine to help with this however she is no longer having to use meclizine and is able to function without any medications for this.   Her second concern is that of her chronic neck and arm pain.  She has a long history of neck pain   and had a neck fusion in 2011.  She has been dealing with chronic pain in the spring and fall for the last 3 years but usually this pain would go away and she be fine.  This past May she developed pain which was similar to what she has had in the past however this pain never went away.  She was using Tylenol but that really did not seem to help her at all so she saw Dr. Burton and was given baclofen and Vicodin.  The pain is now tolerable but Dr. Burton also ordered an MRI of the area because of the fact that the pain did not go away as it had in the past.  She had the MRI and is here to discuss the results of that.  She has been using over-the-counter salon pas patches to help with her pain and that seems to be helping as well.  She notes that she has had pain that has gone down her arm as well.    Patient Active Problem List   Diagnosis     Migraine     Diabetes type 2, controlled     Essential hypertension     Hypothyroidism     Chronic shoulder pain       Current Outpatient Prescriptions   Medication Sig Dispense Refill     baclofen (LIORESAL) 10 MG tablet Take 1 tablet (10 mg total) by mouth 4 (four) times a day. 120 tablet 2     blood glucose test strips Test once daily and prn. Dispense brand per patient's insurance at pharmacy discretion. 100 each 3     divalproex (DEPAKOTE) 250 MG EC tablet Take 250 mg by mouth 3 (three) times a day.       ESTRACE 0.01 % (0.1 mg/gram) vaginal cream INSERT 2 GRAMS VAGINALLY ONCE DAILY 42.5 g 2     glipiZIDE (GLUCOTROL XL) 2.5 MG 24 hr tablet TAKE 1 TABLET (2.5 MG TOTAL) BY MOUTH DAILY. 90 tablet 0     hydroCHLOROthiazide (HYDRODIURIL) 50 MG tablet TAKE ONE TABLET BY MOUTH ONE TIME DAILY 90 tablet 3     HYDROcodone-acetaminophen 5-325 mg per tablet Take 1-2 tablets by mouth every 4 (four) hours as needed for pain. 30 tablet 0     levothyroxine (SYNTHROID, LEVOTHROID) 75 MCG tablet TAKE ONE TABLET BY MOUTH ONE TIME DAILY 90 tablet 3     lisinopril (PRINIVIL,ZESTRIL)  "40 MG tablet TAKE ONE TABLET BY MOUTH ONE TIME DAILY 90 tablet 1     metoprolol succinate (TOPROL-XL) 50 MG 24 hr tablet TAKE ONE TABLET BY MOUTH TWICE DAILY 180 tablet 3     potassium chloride SA (K-DUR,KLOR-CON) 20 MEQ tablet TAKE ONE TABLET BY MOUTH TWICE A DAY WITH MEALS 180 tablet 2     rizatriptan (MAXALT) 10 MG tablet Take 10 mg by mouth as needed. May repeat in 2 hours if needed        simvastatin (ZOCOR) 20 MG tablet Take 1 tablet (20 mg total) by mouth bedtime. 90 tablet 3     diazePAM (VALIUM) 5 MG tablet Take 1-2 tabs 1 hour prior to test 4 tablet 0     meclizine (ANTIVERT) 12.5 mg tablet Take 1 tablet (12.5 mg total) by mouth 3 (three) times a day as needed for dizziness (do not drive or operate heavy machinery while on antivert). 12 tablet 0     No current facility-administered medications for this visit.        Allergies   Allergen Reactions     Demerol [Meperidine]      Flagyl [Metronidazole]      Ibuprofen        Past Medical History:   Diagnosis Date     Diabetes mellitus      Hypertension        Past Surgical History:   Procedure Laterality Date     CERVICAL FUSION       VAGINAL HYSTERECTOMY  1988    tubes and ovaries remain       History   Smoking Status     Former Smoker   Smokeless Tobacco     Never Used       OBJECTIVE:     /68 (Patient Site: Right Arm, Patient Position: Sitting, Cuff Size: Adult Regular)  Pulse 74 Comment: regular  Temp 98.3  F (36.8  C) (Oral)   Resp 14 Comment: REGULAR  Ht 5' 4\" (1.626 m)  Wt (!) 225 lb 4.8 oz (102.2 kg)  BMI 38.67 kg/m2    Physical Exam:  GENERAL APPEARANCE: A&A, NAD, well hydrated, well nourished  SKIN:  Normal skin turgor, no lesions/rashes   HEENT: moist mucous membranes, no rhinorrhea, PERRLA, TM's with fluid present bilaterally but no evidence for acute infection, Throat without significant erythema or exudate.  NECK: Supple, full ROM, no significant lymphadenopathy or thyromegaly  CV: RRR, no M/G/R   LUNGS: CTAB  EXTREMITY: no edema "   NEURO: no gross deficits.  Neurological exam is within normal limits.  Deep tendon reflexes were equal and symmetrical.  Motor and sensation were intact to both the upper and lower extremities.  Cranial nerves II through XII are grossly intact.  PSYCHIATRIC;  Mood appropriate, memory intact      ASSESSMENT/PLAN:     Neck pain [M54.2]      1. Neck pain  - Ambulatory referral to [facility]    2. Spinal stenosis of cervical region  - Ambulatory referral to [facility]    3. Serous otitis media    Patient overall seems to be doing okay.  In terms of her inner ear infection the amoxicillin seems to have helped the infection however she continues to have fluid present both of her ears.  We discussed the pathophysiology of serous otitis media at length today.  We discussed that this definitely can cause dizziness and what and how that occurs.  At this point there is no evidence for any infection and so I do not think she needs any further antibiotics but definitely the fluid that remains in her ears is probably causing her dizziness when she changes position.  The fluid that is present in her ears should resolve with time.  We talked about the fact that she could use some Sudafed to help with this or sometimes there needs to be tubes put in the ears to help with this but at this point if she can stand it I would have her monitor the ear symptoms and hopefully this will resolve without the use of any medications.  She refuses to try Sudafed if she has had better results with that in the past.  We did go over the results of her MRI which showed some spinal stenosis in her cervical area which I am sure is probably contributing to her neck pain and arm pain.  Dr. Zhang reviewed the results of the MRI is Dr. Burton is out of the office and suggested that patient make an appointment with the spine center.  I reiterated that recommendation today and discussed that with both patient and her  who are here in the  office.  Patient is reluctant to go to the spine center as she is not aware of what they can offer for her.  We discussed that the spine center is not necessarily a place that she would go for surgery but rather a place that can help her delineate what is going on with her neck and arm and what would be the most appropriate treatment plan for her.  Their role is to develop treatment plans for her and if she can avoid surgery that is to everyone's advantage.  Once I explained that patient was willing to go to the spine center.  Referral was placed to the spine center today and someone from their office should contact her regarding getting this scheduled.  All of her questions were answered today.  I have asked her to continue to monitor her ear and dizziness symptoms and if she has additional questions or concerns or if this does not seem like it gradually goes away she certainly should let us know.  I am happy to refer her to ENT for possible placement of tubes if the serous otitis media does not resolve on its own. Total time spent with patient was at least 25 minutes,  of which greater than fifty percent was spent in counselling and coordination of care of the above medical problems.  Tammy Jenkins MD

## 2021-06-13 NOTE — PROGRESS NOTES
OV     10/31/2017  Assessment:     1. Diabetes type 2, controlled  Glycosylated Hemoglobin A1c   2. Essential hypertension with goal blood pressure less than 140/90  Comprehensive Metabolic Panel   3. Mixed hyperlipidemia  Comprehensive Metabolic Panel    Lipid Cascade   4. Hypothyroidism, unspecified type  Thyroid Stimulating Hormone (TSH)    T4, Free   5. Vertigo     6. Degenerative disc disease, cervical          Plan:       Fasting labs were drawn. Blood sugars are under excellent control. We reviewed her current medications for BP, DM and thyroid, and she will continue the same pending additional lab results. We reviewed dietary recommendations, including low salt and high fiber diet, and recommendations for regular exercise/activity. As far as her neck pain, we discussed continuing to be cautious about lifting and other strenuous activity and she will call with the first sign of recurrence. We discussed some early interventions that may help head things off. As far as the dizziness, she will continue with the meclizine prn, and I stressed the importance of adequate hydration, etc. She will plan to follow up in 4-6 mos for repeat fasting labs and med check, sooner if any difficulties.      Subjective:     Diabetes      Isabelle Caraballo is a 62 y.o. female who presents for follow-up of Type 2 diabetes mellitus. Compliance with treatment has been good, and compliance with diet has been fair. Current symptoms/problems include none. Patient denies hyperglycemia, hypoglycemia , paresthesia of the feet and visual disturbances. Home sugars: patient does not check sugars regularly due to cost. When she has tested her blood sugars have been consistently within range. Current monitoring regimen: home blood tests - rarely. Any episodes of hypoglycemia? no. Weight trend: stable. Last dilated eye exam due.     Known diabetic complications: none. Current diabetic medications include oral agent: glipizide (generic). Current side  "effects: none. Prior visit with dietician: yes. Current diet: in general, a \"healthy\" diet  .  Current exercise: no regular exercise. Is she on ACE inhibitor or angiotensin II receptor blocker? Yes.  lisinopril (generic)     Fasting today? Yes    Hyperlipidemia & Hypertension      Patient is here for follow-up of hypertension and dyslipidemia. A repeat fasting lipid profile was done. Compliance with treatment has been good. Patient denies muscle pain associated with her medications.  Current symptoms: none. Patient denies chest pain, dyspnea, exertional chest pressure/discomfort, lower extremity edema, near-syncope and palpitations. The patient exercises infrequently. Previous history of cardiac disease includes: none.    Hypothyroidism       Patient also presents for follow up of hypothyroidism. Current symptoms: none. Patient denies change in energy level, diarrhea, heat / cold intolerance and palpitations. Symptoms have been well-controlled.      Neck Pain      Patient also presents for follow up of neck pain with radiation down left arm. Event that precipitated these symptoms: none known. Onset of symptoms was 6 months ago, and have completely resolved since that time with restricted activity and baclofen. She had good relief with hydrocodone. Current symptoms are none. She had an MRI that showed moderate left foraminal stenosis at C5-6 and mild stenosis at C4-5. Patient has had previous herniated cervical disc and previous spinal surgery - C5-6 Fusion 2011. Previous treatments: physical therapy and chiropractor/manipulation.       She also reports onset of vertigo in early Sept. She was seen in the ER and here in follow up, and also saw Dr Melgoza for evaluation. She reports ongoing mild dizziness, but much better. Certain movements in bed or rapid position changes can trigger it. She's been taking meclizine prn but it makes her very drowsy. Was unable to drive for a while.     The following portions of the " "patient's history were reviewed and updated as appropriate: allergies, current medications, past family history, past medical history, past social history, past surgical history and problem list.    Review of Systems  Pertinent items are noted in HPI.   Knee pain recently on left - pain with changing weather        Objective:        /76  Pulse 76  Ht 5' 4\" (1.626 m)  Wt (!) 224 lb 6 oz (101.8 kg)  BMI 38.51 kg/m2  General:    Alert, cooperative, no distress   Head:    Normocephalic, without obvious abnormality, atraumatic   Eyes:    PERRL, conjunctiva/corneas clear, EOM's intact    Ears:    Normal TM's and external ear canals, both ears   Nose:   Nares normal, mucosa normal, no drainage or sinus tenderness   Throat:   Lips, mucosa, and tongue normal; teeth and gums normal   Neck:   Supple, symmetrical,  no adenopathy;  thyroid:  normal   Back:     Symmetric, ROM normal, no CVA tenderness   Lungs:     Clear to auscultation bilaterally, respirations unlabored   CV:    Regular rate and rhythm   Abdomen:     Soft, non-tender, bowel sounds active all four quadrants,     no masses, no organomegaly   Extremities:   Extremities normal, atraumatic, no cyanosis or edema   Pulses:   2+ and symmetric all extremities   Skin:   Skin color, texture, turgor normal, no rashes or lesions   Neurologic:   CNII-XII intact, normal strength, sensation and reflexes     throughout         Laboratory:  Results for orders placed or performed in visit on 10/31/17   Glycosylated Hemoglobin A1c   Result Value Ref Range    Hemoglobin A1c 6.6 (H) 3.5 - 6.0 %         "

## 2021-07-21 ENCOUNTER — RECORDS - HEALTHEAST (OUTPATIENT)
Dept: ADMINISTRATIVE | Facility: CLINIC | Age: 66
End: 2021-07-21

## 2021-12-04 DIAGNOSIS — G43.709 CHRONIC MIGRAINE WITHOUT AURA WITHOUT STATUS MIGRAINOSUS, NOT INTRACTABLE: ICD-10-CM

## 2021-12-04 NOTE — LETTER
12/4/2021        RE: Isabelle Caraballo  [address]  [place] [address]            Dear Isabelle,      We recently provided you with medication refills.  Many medications require routine follow-up with your doctor. As Dr. Bowles is no longer at our clinic, you will need to schedule a follow up with another neurologist for medication management.     Your prescription(s) have been refilled for 30 days so you may have time for the above noted follow-up. Please call to schedule soon so we can assure you have an appointment before your next refills are needed. If you have already made a follow up appointment, please disregard this letter.           Sincerely,        [facility]     (Formerly known as [facility] [place])

## 2021-12-07 RX ORDER — DIVALPROEX SODIUM 500 MG/1
1000 TABLET, EXTENDED RELEASE ORAL DAILY
Qty: 60 TABLET | Refills: 0 | Status: SHIPPED | OUTPATIENT
Start: 2021-12-07 | End: 2022-01-06

## 2021-12-07 NOTE — TELEPHONE ENCOUNTER
Refill request for Depakote  Letter mailed to patient to schedule follow up appt  Medication T'd for review and signature  Dang Stephens CMA on 12/7/2021 at 11:35 AM
